# Patient Record
(demographics unavailable — no encounter records)

---

## 2025-03-07 NOTE — DATA REVIEWED
[MRI] : MRI [Left] : left [Knee] : knee [I independently reviewed and interpreted images and report] : I independently reviewed and interpreted images and report [FreeTextEntry1] : High-grade partial-thickness cartilage loss along the trochlea, worsened. Full-thickness 1.8 cm cartilage defect along posterior weight-bearing surface of medial femoral condyle, worsened Small knee joint effusion No evidence of meniscal or ligament tear No acute fracture

## 2025-03-07 NOTE — PHYSICAL EXAM
[de-identified] : Examination of the left knee is as follows: INSPECTION: mild effusion, but no ecchymosis, no erythema, no atrophy, no deformities of quad tendon or of patellar tendon PALPATION: medial joint line tenderness, but no palpable mass, no obvious defects, no increased warmth, no calf tenderness ROM: flexion 125 degrees, but extension 0 degrees STRENGTH: quadriceps 5/5, hamstring 5/5 TESTING: ligamentously stable NEURO: light touch is intact throughout GAIT: antalgic, but patient ambulates without assistive device  X-rays of the left knee is as follows:  Knee 3 view in the anteroposterior, lateral, skyline views: moderate tricompartmental OA with medial joint space narrowing

## 2025-03-07 NOTE — ASSESSMENT
[FreeTextEntry1] : 55 yo M with Left knee mild to moderate osteoarthritis. Reviewed with patient MRi and Xray, demonstrating mild to moderate degenerative changes in the knee. Discussed with patient that they are indicated for Left Knee CSI due to pain and inflammation associated with OA.  -Discussed risks, benefits, alternatives of left knee intraarticular CSI, patient tolerated well -Discussed with patient that when pain worsens that they will be indicated for TKA, patient understands -Discussed with patient that they are unable to have surgery for at least three months after CSI due to increased risk of infection. Patient understands. -Activities as tolerated -Rest, ice, compression, elevation, NSAIDs PRN for pain.  -All questions answered -F/u PRN  The diagnosis was explained in detail. The potential non-surgical and surgical treatments were reviewed. The relative risks and benefits of each option were considered relative to the patients age, activity level, medical history, symptom severity and previously attempted treatments.  The patient was advised to consult with their primary medical provider prior to initiation of any new medications to reduce the risk of adverse effects specific to their long-term home medications and medical history. The risk of gastrointestinal irritation and kidney injury specific to long-term NSAID use was discussed.   Entered by Jv Plascencia acting as scribe. Dr. Stack Attestation The documentation recorded by the scribe, in my presence, accurately reflects the service I, Dr. Stack, personally performed, and the decisions made by me with my edits as appropriate.

## 2025-03-07 NOTE — HISTORY OF PRESENT ILLNESS
[Sudden] : sudden [7] : 7 [2] : 2 [Burning] : burning [Dull/Aching] : dull/aching [Sharp] : sharp [Throbbing] : throbbing [Intermittent] : intermittent [Household chores] : household chores [Leisure] : leisure [Social interactions] : social interactions [Rest] : rest [Full time] : Work status: full time [de-identified] : Patient here for left knee. Patient had x-ray at  and MRI at  on 3/5/25. Patient states NKI. Patient states pain started 1 month ago. Patient states pain is in the medial aspect of the knee and above the patella that is burning, sharp with weight bearing. Patient states he feels like his knee is giving out on his as well as getting stuck. Patient came into office weight bearing on his own.   IMPRESSION:  High-grade partial-thickness cartilage loss along the trochlea, worsened. 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